# Patient Record
Sex: MALE | Race: BLACK OR AFRICAN AMERICAN | NOT HISPANIC OR LATINO | URBAN - METROPOLITAN AREA
[De-identification: names, ages, dates, MRNs, and addresses within clinical notes are randomized per-mention and may not be internally consistent; named-entity substitution may affect disease eponyms.]

---

## 2017-02-12 ENCOUNTER — EMERGENCY (EMERGENCY)
Facility: HOSPITAL | Age: 22
LOS: 1 days | Discharge: HOME | End: 2017-02-12
Attending: EMERGENCY MEDICINE
Payer: COMMERCIAL

## 2017-02-12 VITALS
HEART RATE: 82 BPM | DIASTOLIC BLOOD PRESSURE: 75 MMHG | HEIGHT: 70 IN | RESPIRATION RATE: 16 BRPM | WEIGHT: 134.92 LBS | TEMPERATURE: 98 F | SYSTOLIC BLOOD PRESSURE: 135 MMHG | OXYGEN SATURATION: 97 %

## 2017-02-12 VITALS
HEART RATE: 78 BPM | DIASTOLIC BLOOD PRESSURE: 65 MMHG | RESPIRATION RATE: 16 BRPM | SYSTOLIC BLOOD PRESSURE: 116 MMHG | TEMPERATURE: 99 F | OXYGEN SATURATION: 99 %
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
## 2017-02-12 DIAGNOSIS — R40.0 SOMNOLENCE: ICD-10-CM

## 2017-02-12 DIAGNOSIS — S32.008A OTHER FRACTURE OF UNSPECIFIED LUMBAR VERTEBRA, INITIAL ENCOUNTER FOR CLOSED FRACTURE: ICD-10-CM

## 2017-02-12 DIAGNOSIS — M54.5 LOW BACK PAIN: ICD-10-CM
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
## 2017-02-12 PROCEDURE — 99053 MED SERV 10PM-8AM 24 HR FAC: CPT

## 2017-02-12 PROCEDURE — 72125 CT NECK SPINE W/O DYE: CPT

## 2017-02-12 PROCEDURE — 70450 CT HEAD/BRAIN W/O DYE: CPT

## 2017-02-12 PROCEDURE — 70450 CT HEAD/BRAIN W/O DYE: CPT | Mod: 26

## 2017-02-12 PROCEDURE — 72131 CT LUMBAR SPINE W/O DYE: CPT | Mod: 26

## 2017-02-12 PROCEDURE — 99284 EMERGENCY DEPT VISIT MOD MDM: CPT | Mod: 25

## 2017-02-12 PROCEDURE — 99244 OFF/OP CNSLTJ NEW/EST MOD 40: CPT

## 2017-02-12 PROCEDURE — 72125 CT NECK SPINE W/O DYE: CPT | Mod: 26

## 2017-02-12 PROCEDURE — 72131 CT LUMBAR SPINE W/O DYE: CPT
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
## 2017-02-12 NOTE — ED ADULT NURSE REASSESSMENT NOTE - NS ED NURSE REASSESS COMMENT FT1
Pt A&OX3, amb ad mejia with no difficulty, pt ate half a sandwich and snacks with no difficulty.  Denies pain and abdominal symptoms.  Pt still waiting for spine consult.  Pt sleeping at this time.  Will continue to monitor.

## 2017-02-12 NOTE — ED PROVIDER NOTE - PROGRESS NOTE DETAILS
CT noted.  case d/w trauma resident and will eval. pt evaluated by Dr Lubin and trauma team and cleared from trauma standpoint. case d/w Jeanine (ortho) and states stable isolated lumbar tp fx may be d/c home with f/u in his office
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
## 2017-02-12 NOTE — ED ADULT NURSE REASSESSMENT NOTE - NS ED NURSE REASSESS COMMENT FT1
Patient received at 0700; awake; alert and oriented x4. Denies pain or discomfort at this time. clear BBS. abd soft, nondistended and nontender. moving all extremities well. Denies SOB, dizziness. No distress noted. VSS. Respirations unlabored. Continue to monitor patient and maintain safety.
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
## 2019-09-16 NOTE — ED PROVIDER NOTE - DISPOSITION TYPE
Addended by: RAJNI DENNISON on: 9/16/2019 12:40 PM     Modules accepted: Orders    
Addended by: RJANI DENNISON on: 9/16/2019 01:52 PM     Modules accepted: Orders    
DISCHARGE

## 2025-05-24 ENCOUNTER — NON-APPOINTMENT (OUTPATIENT)
Age: 30
End: 2025-05-24